# Patient Record
Sex: FEMALE | Race: WHITE | NOT HISPANIC OR LATINO
[De-identification: names, ages, dates, MRNs, and addresses within clinical notes are randomized per-mention and may not be internally consistent; named-entity substitution may affect disease eponyms.]

---

## 2023-06-27 PROBLEM — Z00.00 ENCOUNTER FOR PREVENTIVE HEALTH EXAMINATION: Status: ACTIVE | Noted: 2023-06-27

## 2023-06-29 ENCOUNTER — APPOINTMENT (OUTPATIENT)
Dept: ORTHOPEDIC SURGERY | Facility: CLINIC | Age: 63
End: 2023-06-29
Payer: COMMERCIAL

## 2023-06-29 VITALS — WEIGHT: 224 LBS | HEIGHT: 71 IN | BODY MASS INDEX: 31.36 KG/M2

## 2023-06-29 DIAGNOSIS — M17.11 UNILATERAL PRIMARY OSTEOARTHRITIS, RIGHT KNEE: ICD-10-CM

## 2023-06-29 PROCEDURE — 20610 DRAIN/INJ JOINT/BURSA W/O US: CPT | Mod: RT

## 2023-06-29 PROCEDURE — 73564 X-RAY EXAM KNEE 4 OR MORE: CPT | Mod: RT

## 2023-06-29 PROCEDURE — 73610 X-RAY EXAM OF ANKLE: CPT | Mod: RT

## 2023-06-29 PROCEDURE — 99203 OFFICE O/P NEW LOW 30 MIN: CPT | Mod: 25

## 2023-06-29 RX ORDER — IBUPROFEN 600 MG/1
600 TABLET, FILM COATED ORAL
Qty: 30 | Refills: 1 | Status: ACTIVE | COMMUNITY
Start: 2023-06-29 | End: 1900-01-01

## 2023-06-29 NOTE — PHYSICAL EXAM
[de-identified] : Patient is WDWN, alert, and in no acute distress. Breathing is unlabored. She is grossly oriented to person, place, and time.\par \par Right knee: \par There is medial joint line tenderness to palpation. No lateral joint line tenderness. Mild swelling, no valgus or valgus instability present on provocative testing. \par Range of motion: Active flexion and extension are full and painless. Crepitus noted.\par Tests and Signs: All tests for stability are normal. \par Strength: flexion and extension 5/5 \par \par Right Lower Extremity:\par Varicose veins noted to the lower extremity, in the region overlying the gastrocnemius/soleus.  \par Soft tissue edema noted medially to the ankle.\par No signs of infection.\par There is swelling noted medially to the tib/fib.\par No tenderness of the gastrocnemius on exam.\par Full ankle ROM.\par Sensation to the toes intact.  [de-identified] : AP, lateral, skyline, and tunnel views of the RIGHT knee were obtained today and revealed moderate arthritis at the medial compartment as well as patellofemoral joint arthritis. \par \par AP, lateral and oblique views of the RIGHT ankle were obtained today and revealed no abnormalities. No acute fracture. No dislocation. Cartilage spaces are maintained.

## 2023-06-29 NOTE — END OF VISIT
[FreeTextEntry3] : All medical record entries made by the Scribe were at my,  Dr. Umesh Mayer MD., direction and personally dictated by me on 06/29/2023. I have personally reviewed the chart and agree that the record accurately reflects my personal performance of the history, physical exam, assessment and plan.

## 2023-06-29 NOTE — DISCUSSION/SUMMARY
[de-identified] : The underlying pathophysiology was reviewed with the patient. XR films were reviewed with the patient. Discussed at length the nature of the patient’s condition. The right knee symptoms are secondary to medial compartment and patellofemoral arthritis.\par \par With regard to the right knee, I did tell her that based upon her xrays she has evidence of moderate arthritis, which is likely the source of her pain as well as associated symptoms. We discussed treatment options of symptomatic treatment with an oral and topical antiinflammatory versus a cortisone injection at the right knee. She stated she has been taking Naprosyn without relief of her symptoms. She has therefore opted to proceed with a cortisone injection at the right knee.\par The patient wishes to proceed with a cortisone injection today. Under sterile precautions, an injection of 5cc 1% lidocaine without epinephrine and 0.5cc Kenalog 40mg, 0.5cc Dexamethasone was administered into the RIGHT knee (1). The patient tolerated the procedure well. \par \par With regard to the right ankle, I explained to her that based off of her xrays, I see no concerning findings that would require any sort of major intervention. She did ask me if I thought the swelling at her right ankle was secondary to cellulitis, to which I told her that I do not believe so as based upon her exam, there does not appear to be any sort of skin infection. I recommended elevation of the lower extremity as well as use of an oral and topical antiinflammatory.\par \par RX: Ibuprofen 600mg, BID (60 tablets). \par \par All questions answered, understanding verbalized. Patient in agreement with plan of care. Follow up in 4 to 6 weeks for reassessment, if needed.

## 2023-06-29 NOTE — HISTORY OF PRESENT ILLNESS
[de-identified] : 63y/o female presents with right knee pain for 2 months, and right ankle pain/swelling for 2 days. Patient denies any trauma or injury. She also reports buckling of right knee. Walking worsens the pain. No prior injections. No prior x-rays.  She takes Naprosyn without relief. 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

## 2023-06-29 NOTE — ADDENDUM
[FreeTextEntry1] : I, Di Campos wrote this note acting as a scribe for Dr. Umesh Mayer on Jun 29, 2023.

## 2024-06-04 ENCOUNTER — RESULT REVIEW (OUTPATIENT)
Age: 64
End: 2024-06-04

## 2025-02-25 ENCOUNTER — RESULT REVIEW (OUTPATIENT)
Age: 65
End: 2025-02-25

## 2025-02-28 ENCOUNTER — NON-APPOINTMENT (OUTPATIENT)
Age: 65
End: 2025-02-28